# Patient Record
Sex: FEMALE | Race: BLACK OR AFRICAN AMERICAN | NOT HISPANIC OR LATINO | URBAN - NONMETROPOLITAN AREA
[De-identification: names, ages, dates, MRNs, and addresses within clinical notes are randomized per-mention and may not be internally consistent; named-entity substitution may affect disease eponyms.]

---

## 2019-06-04 ENCOUNTER — IMPORTED ENCOUNTER (OUTPATIENT)
Dept: URBAN - NONMETROPOLITAN AREA CLINIC 1 | Facility: CLINIC | Age: 79
End: 2019-06-04

## 2019-06-04 PROBLEM — H52.4: Noted: 2021-03-31

## 2019-06-04 PROBLEM — H52.4: Noted: 2019-06-04

## 2019-06-04 PROBLEM — H52.223: Noted: 2019-06-04

## 2019-06-04 PROBLEM — H52.03: Noted: 2019-06-04

## 2019-06-04 PROBLEM — H25.813: Noted: 2019-06-04

## 2019-06-04 PROBLEM — H35.033: Noted: 2019-06-04

## 2019-06-04 PROCEDURE — 92015 DETERMINE REFRACTIVE STATE: CPT

## 2019-06-04 PROCEDURE — 99214 OFFICE O/P EST MOD 30 MIN: CPT

## 2019-06-04 NOTE — PATIENT DISCUSSION
Cataract OU-Not yet surgical. -Reviewed symptoms of advancing cataract growth such as glare and halos and decreased vision.-Continue to monitor for now. Pt will notify us if any new symptoms develop. HTN Retinopathy OU- Discussed the importance of blood pressure control in the prevention of ocular complications. - Discussed possible association with systemic conditions including hypertension and diabetes. - Stressed the importance of controlling vascular risk factors. - Recommended observation. Hyperopia-Discussed diagnosis with patient. Astigmatism-Discussed diagnosis with patient. Presbyopia-Discussed diagnosis with patient. Updated spec Rx given. Recommend lens that will provide comfort as well as protect safety and health of eyes.

## 2019-08-13 ENCOUNTER — IMPORTED ENCOUNTER (OUTPATIENT)
Dept: URBAN - NONMETROPOLITAN AREA CLINIC 1 | Facility: CLINIC | Age: 79
End: 2019-08-13

## 2021-03-31 ENCOUNTER — IMPORTED ENCOUNTER (OUTPATIENT)
Dept: URBAN - NONMETROPOLITAN AREA CLINIC 1 | Facility: CLINIC | Age: 81
End: 2021-03-31

## 2021-03-31 PROCEDURE — 92014 COMPRE OPH EXAM EST PT 1/>: CPT

## 2021-03-31 PROCEDURE — 92015 DETERMINE REFRACTIVE STATE: CPT

## 2021-03-31 NOTE — PATIENT DISCUSSION
Combined Cataracts OUDiscussed diagnosis with patient. Reviewed symptoms related to cataract progression. Discussed various treatment options with patient. Recommend cataract evaluation by Dr. Kolton Linder. Patient defers treatment at this time. Continue to monitor. Presbyopia OUDiscussed refractive status in detail with patient. New glasses Rx given today. (not rec to fill due to cataracts)Continue to monitor.

## 2021-04-26 NOTE — PATIENT DISCUSSION
2/8/21 PT HAD LAST AVASTIN TX OS ON 12/7/20 - HAS BEEN GETTING TX EVERY 6 WKS AND HAS RECEIVED EYLEA AND OZURDEX WITHOUT MUCH IMPROVMENT.

## 2021-04-26 NOTE — PATIENT DISCUSSION
4 26 21 NO PROGRESSION OFF MEDS - SLIGHT IMPROVEMENT ON OCT MEASUREMENTS COMPARED TO 2 8 21 WITHOUT TX - TO HOLD ON TX AND REEVAL UP NORTH IN 6 WKS - ON FURTHER DISCUSSSION WITH PT HE WANTS TO HOLD ON EVAL UP [place] AS IT IS GETTING BETTER OFF TX - REEVAL HER IN 6 MONTHS.

## 2021-07-29 NOTE — PATIENT DISCUSSION
4 26 21 NO PROGRESSION OFF MEDS - SLIGHT IMPROVEMENT ON OCT MEASUREMENTS COMPARED TO 2 8 21 WITHOUT TX - TO HOLD ON TX AND REEVAL UP NORTH IN 6 WKS - ON FURTHER DISCUSSSION WITH PT HE WANTS TO HOLD ON EVAL UP [place] AS IT IS GETTING BETTER OFF TX - REEVAL HER IN 6 MONTHS.

## 2022-04-10 ASSESSMENT — TONOMETRY
OD_IOP_MMHG: 12
OD_IOP_MMHG: 15
OS_IOP_MMHG: 12
OS_IOP_MMHG: 16

## 2022-04-10 ASSESSMENT — VISUAL ACUITY
OD_SC: 20/25-2
OD_SC: 20/40+2
OS_SC: 20/30
OS_SC: 20/25-2
OD_SC: 20/50
OS_SC: 20/25-2

## 2022-11-09 ENCOUNTER — ESTABLISHED PATIENT (OUTPATIENT)
Dept: RURAL CLINIC 3 | Facility: CLINIC | Age: 82
End: 2022-11-09

## 2022-11-09 DIAGNOSIS — H52.4: ICD-10-CM

## 2022-11-09 PROCEDURE — 92014 COMPRE OPH EXAM EST PT 1/>: CPT

## 2022-11-09 PROCEDURE — 92015 DETERMINE REFRACTIVE STATE: CPT

## 2022-11-09 ASSESSMENT — VISUAL ACUITY
OD_CC: 20/30-2
OS_CC: 20/30-1

## 2022-11-09 ASSESSMENT — TONOMETRY
OS_IOP_MMHG: 14
OD_IOP_MMHG: 15

## 2022-11-09 NOTE — PATIENT DISCUSSION
MR done today but do not recommend updating due to cataract progression. Continue to monitor. ,DirectAddress_Unknown

## 2022-11-09 NOTE — PROCEDURE NOTE: CLINICAL
PROCEDURE NOTE: Avastin 1.25mg OS. Diagnosis: Branch Retinal Vein Occlusion with Macular Edema. Anesthesia: Topical. Prep: Betadine Drops. Prior to injection, risks/benefits/alternatives discussed including infection, loss of vision, hemorrhage, cataract, glaucoma, retinal tears or detachment. The off-label status of Intravitreal Avastin also was reviewed. The patient wished to proceed with treatment. Topical anesthesia was induced with Alcaine. Additional anesthesia was achieved using drop(s) or injection checked above. A drop of Povidone-iodine 5% ophthalmic solution was instilled over the injection site and in the inferior fornix. Betadine prep was performed. Using the syringe provided, Avastin 1.25 mg in 0.05 cc was injected into the vitreous cavity. The needle was passed 3.0 mm posterior to the limbus in pseudophakic patients, and 3.5 mm posterior to the limbus in phakic patients. Patient tolerated procedure well. There were no complications. Injection time: 4:40. Lot #: [id number]. Expiration Date: 3566-79-41Y73:00:00. Post-op instructions given. Inventory Id: null. The patient was instructed to return for re-evaluation in approximately 4-12 weeks depending on his/her condition and was told to call immediately if vision decreases and/or if his/her eye becomes red, painful, and/or light sensitive. The patient was instructed to go to the emergency room or call 911 if unable to reach the doctor within an hour or two of trying or calling. The patient was instructed to use Artificial Tears q.i.d. p.r.n for comfort. Coco Brennan

## 2022-11-09 NOTE — PATIENT DISCUSSION
Discussed diagnosis in detail with patient. Reviewed symptoms related to cataract progression. Recommend refer to Dr. Katerina Rios for cataract evaluation. Patient defers treatment at this time. Continue to monitor.

## 2022-11-09 NOTE — PATIENT DISCUSSION
4 26 21 NO PROGRESSION OFF MEDS - SLIGHT IMPROVEMENT ON OCT MEASUREMENTS COMPARED TO 2 8 21 WITHOUT TX - TO HOLD ON TX AND REEVAL UP NORTH IN 6 WKS - ON FURTHER DISCUSSSION WITH PT HE WANTS TO HOLD ON EVAL UP [place] AS IT IS GETTING BETTER OFF TX - REEVAL HER IN 6 MONTHS.

## 2022-12-14 NOTE — PROCEDURE NOTE: CLINICAL
PROCEDURE NOTE: Avastin 1.25mg OS. Diagnosis: Branch Retinal Vein Occlusion with Macular Edema. Anesthesia: Lidocaine 4%. Prep: Betadine Drops. Prior to injection, risks/benefits/alternatives discussed including infection, loss of vision, hemorrhage, cataract, glaucoma, retinal tears or detachment. The off-label status of Intravitreal Avastin also was reviewed. The patient wished to proceed with treatment. Topical anesthesia was induced with Alcaine. Additional anesthesia was achieved using drop(s) or injection checked above. A drop of Povidone-iodine 5% ophthalmic solution was instilled over the injection site and in the inferior fornix. Betadine prep was performed. Using the syringe provided, Avastin 1.25 mg in 0.05 cc was injected into the vitreous cavity. The needle was passed 3.0 mm posterior to the limbus in pseudophakic patients, and 3.5 mm posterior to the limbus in phakic patients. Patient tolerated procedure well. There were no complications. Injection time: 2:34. Lot #: [id number]. Expiration Date: 9989-69-72L74:00:00. Post-op instructions given. Inventory Id: null. The patient was instructed to return for re-evaluation in approximately 4-12 weeks depending on his/her condition and was told to call immediately if vision decreases and/or if his/her eye becomes red, painful, and/or light sensitive. The patient was instructed to go to the emergency room or call 911 if unable to reach the doctor within an hour or two of trying or calling. The patient was instructed to use Artificial Tears q.i.d. p.r.n for comfort. Feli Hall

## 2022-12-14 NOTE — PATIENT DISCUSSION
4 26 21 NO PROGRESSION OFF MEDS - SLIGHT IMPROVEMENT ON OCT MEASUREMENTS COMPARED TO 2 8 21 WITHOUT TX - TO HOLD ON TX AND REEVAL UP NORTH IN 6 WKS - ON FURTHER DISCUSSSION WITH PT HE WANTS TO HOLD ON EVAL UP [place] AS IT IS GETTING BETTER OFF TX - REEVAL HER IN 6 MONTHS.

## 2023-01-18 NOTE — PROCEDURE NOTE: CLINICAL
PROCEDURE NOTE: Avastin () OS. Diagnosis: Branch Retinal Vein Occlusion with Macular Edema. Anesthesia: Lidocaine 4%. Prep: Betadine Drops. Prior to injection, risks/benefits/alternatives discussed including infection, loss of vision, hemorrhage, cataract, glaucoma, retinal tears or detachment. The off-label status of Intravitreal Avastin also was reviewed. The patient wished to proceed with treatment. Topical anesthesia was induced with Alcaine. Additional anesthesia was achieved using drop(s) or injection checked above. a drop of Povidone-iodine 5% ophthalmic solution was instilled over the injection site and in the inferior fornix. Betadine prep was performed. Using the syringe provided, Avastin 1.25 mg in 0.05 cc was injected into the vitreous cavity. The needle was passed 3.0 mm posterior to the limbus in pseudophakic patients, and 3.5 mm posterior to the lumbus in phakic patients. The remainder of the Avastin in the single-use vial was then discarded in a medical waste disposal container. Unused medication was discarded. Patient tolerated procedure well. There were no complications. Injection time: 9:00AM. Post-op instructions given. Expiration Date: 4/5/2023. The patient was instructed to return for re-evaluation in approximately 4-12 weeks depending on his/her condition and was told to call immediately if vision decreases and/or if his/her eye becomes red, painful, and/or light sensitive. The patient was instructed to go to the emergency room or call 911 if unable to reach the doctor within an hour or two of trying or calling. The patient was instructed to use Artificial Tears q.i.d. p.r.n for comfort. Kezia Morales

## 2023-01-18 NOTE — PATIENT DISCUSSION
4 26 21 NO PROGRESSION OFF MEDS - SLIGHT IMPROVEMENT ON OCT MEASUREMENTS COMPARED TO 2 8 21 WITHOUT TX - TO HOLD ON TX AND REEVAL UP NORTH IN 6 WKS - ON FURTHER DISCUSSSION WITH PT HE WANTS TO HOLD ON EVAL UP [place] AS IT IS GETTING BETTER OFF TX - REEVAL HER IN 6 MONTHS.

## 2023-12-06 ENCOUNTER — ESTABLISHED PATIENT (OUTPATIENT)
Dept: RURAL CLINIC 3 | Facility: CLINIC | Age: 83
End: 2023-12-06

## 2023-12-06 DIAGNOSIS — H52.4: ICD-10-CM

## 2023-12-06 PROCEDURE — 92015 DETERMINE REFRACTIVE STATE: CPT

## 2023-12-06 PROCEDURE — 92014 COMPRE OPH EXAM EST PT 1/>: CPT

## 2023-12-06 ASSESSMENT — VISUAL ACUITY
OD_CC: 20/50
OS_CC: 20/30
OD_PH: 20/30

## 2023-12-06 ASSESSMENT — TONOMETRY
OD_IOP_MMHG: 12
OS_IOP_MMHG: 12